# Patient Record
Sex: FEMALE | NOT HISPANIC OR LATINO | Employment: UNEMPLOYED | URBAN - METROPOLITAN AREA
[De-identification: names, ages, dates, MRNs, and addresses within clinical notes are randomized per-mention and may not be internally consistent; named-entity substitution may affect disease eponyms.]

---

## 2023-02-14 ENCOUNTER — TELEPHONE (OUTPATIENT)
Dept: NEUROSURGERY | Facility: CLINIC | Age: 28
End: 2023-02-14
Payer: MEDICAID

## 2023-02-14 NOTE — TELEPHONE ENCOUNTER
DELIA PT THIS AM. SHE WILL BE GETTING A  FOR DELIVERY IN ABOUT 3-4 WEEKS AND WILL TAKE A FEW MONTHS TO SETTLE IN WITH HER . SHE WILL CALL ME WHEN SHE IS READY TO TRAVEL TO [facility] FOR A VISIT. SHE WILL GET HER IMAGING ON CD AND CALL ME FOR AN APPOINTMENT. SHE HAS MY NUMBER.

## 2023-02-14 NOTE — TELEPHONE ENCOUNTER
----- Message from Laine Rob sent at 2/13/2023  5:36 PM CST -----  Hello,    Patient is being referred forpituitary adenoma . Referral  and records are scanned in media mgr. Please contact patient to schedule.    Thanks